# Patient Record
Sex: FEMALE
[De-identification: names, ages, dates, MRNs, and addresses within clinical notes are randomized per-mention and may not be internally consistent; named-entity substitution may affect disease eponyms.]

---

## 2019-02-15 NOTE — RAD
Date of service: 



2019-02-15 18:48:38



PROCEDURE:  Left Knee Radiographs.



HISTORY:

Pain.



COMPARISON:

None.



FINDINGS:



BONES:

No evidence of acute displaced fracture nor dislocation.  The osseous 

structures appear intact.  . 



JOINTS:

Joint spaces relatively preserved.  However there appears to be very 

tiny medial marginal osteophyte formation arising from the tibial 

plateau.  Slight spurring tibial spines.  Tiny posterior patella 

osteophyte also felt be present. 



JOINT EFFUSION:

None. 



OTHER FINDINGS:

None.



IMPRESSION:

No acute fractures.  Minimal degenerative osteoarthritis.  Consider 

follow-up MRI if symptoms persist or internal derangement suspected 

clinically

## 2019-03-09 ENCOUNTER — HOSPITAL ENCOUNTER (OUTPATIENT)
Dept: HOSPITAL 42 - RAD | Age: 64
End: 2019-03-09
Payer: COMMERCIAL

## 2019-03-09 DIAGNOSIS — Z12.31: Primary | ICD-10-CM
